# Patient Record
Sex: FEMALE | Race: OTHER | HISPANIC OR LATINO | URBAN - METROPOLITAN AREA
[De-identification: names, ages, dates, MRNs, and addresses within clinical notes are randomized per-mention and may not be internally consistent; named-entity substitution may affect disease eponyms.]

---

## 2021-02-22 ENCOUNTER — EMERGENCY (EMERGENCY)
Facility: HOSPITAL | Age: 26
LOS: 1 days | Discharge: HOME | End: 2021-02-22
Attending: STUDENT IN AN ORGANIZED HEALTH CARE EDUCATION/TRAINING PROGRAM
Payer: MEDICAID

## 2021-02-22 VITALS
WEIGHT: 190.04 LBS | TEMPERATURE: 98 F | SYSTOLIC BLOOD PRESSURE: 117 MMHG | OXYGEN SATURATION: 99 % | RESPIRATION RATE: 18 BRPM | HEIGHT: 64 IN | HEART RATE: 70 BPM | DIASTOLIC BLOOD PRESSURE: 78 MMHG

## 2021-02-22 PROCEDURE — 99285 EMERGENCY DEPT VISIT HI MDM: CPT

## 2021-02-22 PROCEDURE — 70486 CT MAXILLOFACIAL W/O DYE: CPT

## 2021-02-22 PROCEDURE — 99284 EMERGENCY DEPT VISIT MOD MDM: CPT | Mod: 25

## 2021-02-22 PROCEDURE — 70486 CT MAXILLOFACIAL W/O DYE: CPT | Mod: 26

## 2021-02-22 PROCEDURE — 70450 CT HEAD/BRAIN W/O DYE: CPT | Mod: 26

## 2021-02-22 PROCEDURE — 70450 CT HEAD/BRAIN W/O DYE: CPT

## 2021-02-22 NOTE — ED PROVIDER NOTE - CLINICAL SUMMARY MEDICAL DECISION MAKING FREE TEXT BOX
25F presenting after head injury. periorbital ecchymosis, no li sign, raccoon eyes. non-focal neuro exam. unlikely clinically significant intracranial hemorrhage, but will get CT scan.

## 2021-02-22 NOTE — ED PROVIDER NOTE - PATIENT PORTAL LINK FT
You can access the FollowMyHealth Patient Portal offered by [facility] by registering at the following website: [email or web address]. By joining [employer]’s FollowMyHealth portal, you will also be able to view your health information using other applications (apps) compatible with our system.

## 2021-02-22 NOTE — ED ADULT NURSE NOTE - CHPI ED NUR SYMPTOMS NEG
no blurred vision/no change in level of consciousness/no loss of consciousness/no seizure/no syncope/no vomiting/no weakness

## 2021-02-22 NOTE — ED ADULT NURSE NOTE - OBJECTIVE STATEMENT
As per pt, c/o L forehead and L eye pain and under the eye hematoma w/ swelling, medial forehead bruising noted, and L lateral side of the head bruising s/p trip and fall on the door knob of her house saturday night. No other obvious traumas noted. Pt denies LOC, H/a, lightheadedness, dizziness, blurred vision, SOB, CP, f/c, n/v/d, or cough.

## 2021-02-22 NOTE — ED PROVIDER NOTE - PHYSICAL EXAMINATION
General: well appearing female, no acute distress   HEENT: left periorbital ecchymosis, PERRL, EOMI   Respiratory: normal work of breathing  MSK: no swelling or tenderness of lower extremities, moving all extremities spontaneously   Skin: warm, dry   Neuro: A&Ox3, cranial nerves II-XII intact, 5/5 strength in all extremities, normal gait   Psych: appropriate affect

## 2021-02-22 NOTE — ED PROVIDER NOTE - OBJECTIVE STATEMENT
25F, no pmh presenting two days after slipping on ice. patient stuck her head on the doorknob and reports being unconsciousness for several minutes. has had no symptoms  until she awoke this morning with a left sided headache and bruising around her left eye. no nausea, vomiting, changes in vision, chest pain or shortness of breath.

## 2021-02-22 NOTE — ED PROVIDER NOTE - NSFOLLOWUPINSTRUCTIONS_ED_ALL_ED_FT
You were seen in the emergency department for headache and facial bruising after a fall.     Please follow-up with your primary care doctor in the next 24-48 hours.     Please take Tylenol and Ibuprofen as prescribed on the bottles for pain control.     If you have any worsening symptoms, severe headache, nausea or vomiting, please return to the emergency department.

## 2021-02-22 NOTE — ED ADULT TRIAGE NOTE - CHIEF COMPLAINT QUOTE
sent from urgent care for imaging s/p head injury 2/20 . reports slipped on ice / fell face on door knob + loc , headache   today w/ bruising to under Left eye , still w/ swelling to forehead . no HA's no n/v